# Patient Record
Sex: MALE | Race: WHITE | NOT HISPANIC OR LATINO | Employment: OTHER | URBAN - NONMETROPOLITAN AREA
[De-identification: names, ages, dates, MRNs, and addresses within clinical notes are randomized per-mention and may not be internally consistent; named-entity substitution may affect disease eponyms.]

---

## 2018-02-27 ENCOUNTER — OFFICE VISIT (OUTPATIENT)
Dept: NEUROSURGERY | Facility: CLINIC | Age: 73
End: 2018-02-27

## 2018-02-27 VITALS
SYSTOLIC BLOOD PRESSURE: 124 MMHG | BODY MASS INDEX: 24.5 KG/M2 | HEIGHT: 71 IN | WEIGHT: 175 LBS | DIASTOLIC BLOOD PRESSURE: 70 MMHG

## 2018-02-27 DIAGNOSIS — F17.200 SMOKER: ICD-10-CM

## 2018-02-27 DIAGNOSIS — G60.9 HEREDITARY AND IDIOPATHIC PERIPHERAL NEUROPATHY: Primary | ICD-10-CM

## 2018-02-27 DIAGNOSIS — IMO0001 NORMAL BODY MASS INDEX (BMI): ICD-10-CM

## 2018-02-27 PROCEDURE — 99204 OFFICE O/P NEW MOD 45 MIN: CPT | Performed by: NURSE PRACTITIONER

## 2018-02-27 RX ORDER — TEMAZEPAM 15 MG/1
CAPSULE ORAL
Refills: 1 | COMMUNITY
Start: 2018-01-11

## 2018-02-27 RX ORDER — GABAPENTIN 100 MG/1
100 CAPSULE ORAL 3 TIMES DAILY
Qty: 90 CAPSULE | Refills: 2 | Status: SHIPPED | OUTPATIENT
Start: 2018-02-27

## 2018-02-27 NOTE — PROGRESS NOTES
Chief complaint:   Chief Complaint   Patient presents with   • Numbness in both feet, mainly in the big toe     Andre was referred today by Dr. Saleh for follow up, he states he is not really sure why he is here, he does not have any back pain, he does have numbness in his big toe on both his right and left foot.  He has had 2 previous back surgeries, but this has been years ago and he did fine.  He also has not had any physical therapy.       Subjective     HPI: This is a 72-year-old male gentleman who is referred to us by Dr. Georgina dumont for numbness and tingling in his lower extremities.  He is here to be evaluated today.  He says this is been going on for several years now.  He does feel like the tingling may be getting a little worse in his feet.  He does have some numbness and tingling in his toes bilaterally.  He is not complaining of any meaningful back pain.  Denies any bowel or bladder incontinence.  Denies any lower extremity pain.  Says that the numbness and tingling can get worse at night.  Is not taking any medication for this.  He is retired.  He is right-hand dominant.  He is single.  He does smoke.  He only takes medication for insomnia.  No modifying factors guarding the numbness and tingling.  He said 2 previous back surgeries with good results.    Review of Systems   Constitutional: Positive for activity change.   Allergic/Immunologic: Positive for environmental allergies.   Neurological: Positive for weakness and numbness.   Psychiatric/Behavioral: Positive for sleep disturbance.   All other systems reviewed and are negative.       Past Medical History:   Diagnosis Date   • Arthritis    • Cancer     bladder cancer     Past Surgical History:   Procedure Laterality Date   • APPENDECTOMY     • BACK SURGERY      Patient has had 2 previous back surgeries   • BLADDER SURGERY  2016   • HERNIA REPAIR     • TOE SURGERY       Family History   Problem Relation Age of Onset   • Arthritis Mother   "  • Heart disease Father    • No Known Problems Sister      Social History   Substance Use Topics   • Smoking status: Current Every Day Smoker     Packs/day: 1.00     Types: Cigarettes   • Smokeless tobacco: Current User     Types: Snuff   • Alcohol use No       (Not in a hospital admission)  Allergies:  Bee venom    Objective      Vital Signs  /70 (BP Location: Right arm, Patient Position: Sitting)  Ht 180.3 cm (71\")  Wt 79.4 kg (175 lb)  BMI 24.41 kg/m2    Physical Exam   Constitutional: He is oriented to person, place, and time. He appears well-developed and well-nourished.   HENT:   Head: Normocephalic.   Eyes: Conjunctivae, EOM and lids are normal. Pupils are equal, round, and reactive to light.   Neck: Normal range of motion.   Cardiovascular: Normal rate, regular rhythm and normal heart sounds.    Pulmonary/Chest: Effort normal and breath sounds normal.   Abdominal: Normal appearance.   Musculoskeletal: Normal range of motion.   Neurological: He is alert and oriented to person, place, and time. He has normal strength and normal reflexes. He displays normal reflexes. No cranial nerve deficit or sensory deficit. GCS eye subscore is 4. GCS verbal subscore is 5. GCS motor subscore is 6.   Skin: Skin is warm.   Psychiatric: He has a normal mood and affect. His speech is normal and behavior is normal. Thought content normal. Cognition and memory are normal.       Results Review: MRI lumbar spine shows the patient does have lumbar stenosis present at L2-3.  He does appear to have had a laminectomy present from L3 to L5 with good decompression.  The lumbar stenosis at L2-3 is severe..  There is central canal stenosis present.  No cord signal change.  No fracture visualized.          Assessment/Plan: At this point the patient is not suffering from any meaningful pain.  It does not appear that he is having any issues from the lumbar stenosis.  I think is doing more with peripheral neuropathy.  We will start " him on gabapentin 100 mg at night.  Should he need to increase he was told to wait 1 week and then start the medication twice a day and if he does feel like he needs to increase again he can do so in a week and get to a total of 1 pill 3 times a day.  We will follow-up again with him in 6-8 weeks to see how is doing.  BMI shows that he is at a healthy weight.  He is a smoker.  Smoking cessation classes were given to the patient.      Andre was seen today for numbness in both feet, mainly in the big toe.    Diagnoses and all orders for this visit:    Hereditary and idiopathic peripheral neuropathy    Smoker    Normal body mass index (BMI)    Other orders  -     gabapentin (NEURONTIN) 100 MG capsule; Take 1 capsule by mouth 3 (Three) Times a Day.        I discussed the patients findings and my recommendations with patient    Charlie Fountain, RODOLFO  02/27/18  2:33 PM